# Patient Record
Sex: MALE | Race: WHITE | Employment: FULL TIME | URBAN - METROPOLITAN AREA
[De-identification: names, ages, dates, MRNs, and addresses within clinical notes are randomized per-mention and may not be internally consistent; named-entity substitution may affect disease eponyms.]

---

## 2018-07-02 ENCOUNTER — TRANSFERRED RECORDS (OUTPATIENT)
Dept: HEMATOLOGY | Facility: CLINIC | Age: 38
End: 2018-07-02

## 2019-04-26 ENCOUNTER — APPOINTMENT (OUTPATIENT)
Dept: GENERAL RADIOLOGY | Facility: CLINIC | Age: 39
End: 2019-04-26
Attending: EMERGENCY MEDICINE
Payer: COMMERCIAL

## 2019-04-26 ENCOUNTER — HOSPITAL ENCOUNTER (EMERGENCY)
Facility: CLINIC | Age: 39
Discharge: HOME | End: 2019-04-26
Attending: EMERGENCY MEDICINE | Admitting: EMERGENCY MEDICINE
Payer: COMMERCIAL

## 2019-04-26 VITALS
HEART RATE: 72 BPM | SYSTOLIC BLOOD PRESSURE: 131 MMHG | DIASTOLIC BLOOD PRESSURE: 96 MMHG | WEIGHT: 165 LBS | OXYGEN SATURATION: 99 % | TEMPERATURE: 97.6 F | RESPIRATION RATE: 16 BRPM

## 2019-04-26 DIAGNOSIS — R07.9 CHEST PAIN, UNSPECIFIED TYPE: ICD-10-CM

## 2019-04-26 LAB
ANION GAP SERPL CALCULATED.3IONS-SCNC: 7 MMOL/L (ref 3–14)
BASOPHILS # BLD AUTO: 0.1 10E9/L (ref 0–0.2)
BASOPHILS NFR BLD AUTO: 1.2 %
BUN SERPL-MCNC: 9 MG/DL (ref 7–30)
CALCIUM SERPL-MCNC: 8.4 MG/DL (ref 8.5–10.1)
CHLORIDE SERPL-SCNC: 108 MMOL/L (ref 94–109)
CO2 SERPL-SCNC: 27 MMOL/L (ref 20–32)
CREAT SERPL-MCNC: 0.87 MG/DL (ref 0.66–1.25)
DIFFERENTIAL METHOD BLD: NORMAL
EOSINOPHIL # BLD AUTO: 0.2 10E9/L (ref 0–0.7)
EOSINOPHIL NFR BLD AUTO: 1.9 %
ERYTHROCYTE [DISTWIDTH] IN BLOOD BY AUTOMATED COUNT: 12.2 % (ref 10–15)
GFR SERPL CREATININE-BSD FRML MDRD: >90 ML/MIN/{1.73_M2}
GLUCOSE SERPL-MCNC: 106 MG/DL (ref 70–99)
HCT VFR BLD AUTO: 46.8 % (ref 40–53)
HGB BLD-MCNC: 15.9 G/DL (ref 13.3–17.7)
IMM GRANULOCYTES # BLD: 0 10E9/L (ref 0–0.4)
IMM GRANULOCYTES NFR BLD: 0.4 %
INTERPRETATION ECG - MUSE: NORMAL
LYMPHOCYTES # BLD AUTO: 2.3 10E9/L (ref 0.8–5.3)
LYMPHOCYTES NFR BLD AUTO: 27.1 %
MCH RBC QN AUTO: 32.7 PG (ref 26.5–33)
MCHC RBC AUTO-ENTMCNC: 34 G/DL (ref 31.5–36.5)
MCV RBC AUTO: 96 FL (ref 78–100)
MONOCYTES # BLD AUTO: 0.7 10E9/L (ref 0–1.3)
MONOCYTES NFR BLD AUTO: 7.8 %
NEUTROPHILS # BLD AUTO: 5.3 10E9/L (ref 1.6–8.3)
NEUTROPHILS NFR BLD AUTO: 61.6 %
NRBC # BLD AUTO: 0 10*3/UL
NRBC BLD AUTO-RTO: 0 /100
PLATELET # BLD AUTO: 297 10E9/L (ref 150–450)
POTASSIUM SERPL-SCNC: 3.6 MMOL/L (ref 3.4–5.3)
RBC # BLD AUTO: 4.86 10E12/L (ref 4.4–5.9)
SODIUM SERPL-SCNC: 142 MMOL/L (ref 133–144)
TROPONIN I SERPL-MCNC: <0.015 UG/L (ref 0–0.04)
TROPONIN I SERPL-MCNC: <0.015 UG/L (ref 0–0.04)
WBC # BLD AUTO: 8.6 10E9/L (ref 4–11)

## 2019-04-26 PROCEDURE — 80048 BASIC METABOLIC PNL TOTAL CA: CPT | Performed by: EMERGENCY MEDICINE

## 2019-04-26 PROCEDURE — 99285 EMERGENCY DEPT VISIT HI MDM: CPT

## 2019-04-26 PROCEDURE — 85025 COMPLETE CBC W/AUTO DIFF WBC: CPT | Performed by: EMERGENCY MEDICINE

## 2019-04-26 PROCEDURE — 71046 X-RAY EXAM CHEST 2 VIEWS: CPT

## 2019-04-26 PROCEDURE — 93005 ELECTROCARDIOGRAM TRACING: CPT

## 2019-04-26 PROCEDURE — 84484 ASSAY OF TROPONIN QUANT: CPT | Performed by: EMERGENCY MEDICINE

## 2019-04-26 ASSESSMENT — ENCOUNTER SYMPTOMS
SHORTNESS OF BREATH: 0
NUMBNESS: 0
PALPITATIONS: 0
ABDOMINAL PAIN: 0
FEVER: 0
NAUSEA: 0
WHEEZING: 0
COUGH: 0
HEADACHES: 0
WEAKNESS: 0

## 2019-04-26 NOTE — ED PROVIDER NOTES
"  History     Chief Complaint:  Chest pain.     HPI:   The history is provided by the patient.      Adriano Nelson is a generally healthy 38 year old male, current daily smoker, who presents with chest pain. The patient reports an onset of \"stabbing\" chest pain 3 hours ago. He denies any wheezing, cough, fever, rash, shortness of breath, or leg swelling with this. He states he has not had this pain before in the past.     Allergies:  No known drug allergies      Medications:    The patient is not currently taking any prescribed medications.     Past Medical History:    The patient does not have any past pertinent medical history.     Past Surgical History:    History reviewed. No pertinent surgical history.     Family History:    Hyperlipidemia: father     Social History:  No current PCP  Marital Status:  Single  Smoking status: current daily smoker     Review of Systems   Constitutional: Negative for fever.   Respiratory: Negative for cough, shortness of breath and wheezing.    Cardiovascular: Positive for chest pain. Negative for palpitations and leg swelling.   Gastrointestinal: Negative for abdominal pain and nausea.   Neurological: Negative for weakness, numbness and headaches.   All other systems reviewed and are negative.      Physical Exam     Patient Vitals for the past 24 hrs:   BP Temp Temp src Pulse Heart Rate Resp SpO2 Weight   04/26/19 1500 (!) 131/96 -- -- -- -- -- 99 % --   04/26/19 1400 130/82 -- -- 72 75 -- 99 % --   04/26/19 1345 127/80 -- -- 64 68 -- 99 % --   04/26/19 1330 (!) 123/91 -- -- 67 68 -- 98 % --   04/26/19 1315 119/89 -- -- 68 64 -- 98 % --   04/26/19 1300 (!) 127/94 -- -- 77 73 -- 99 % --   04/26/19 1245 128/88 -- -- 74 78 -- 97 % --   04/26/19 1230 (!) 143/98 -- -- 73 76 -- 99 % --   04/26/19 1220 (!) 136/98 97.6  F (36.4  C) Oral 81 -- 16 100 % 74.8 kg (165 lb)        Physical Exam  Constitutional: Patient is well appearing. No distress.  Head: Atraumatic.  Mouth/Throat: " Oropharynx is clear and moist. No oropharyngeal exudate.  Eyes: Conjunctivae and EOM are normal. No scleral icterus.  Neck: Normal range of motion. Neck supple.   Cardiovascular: Normal rate, regular rhythm, normal heart sounds and intact distal pulses.   Pulmonary/Chest: Breath sounds normal. No respiratory distress.  Abdominal: Soft. Bowel sounds are normal. No distension. No tenderness. No rebound or guarding.   Musculoskeletal: Normal range of motion. No edema or tenderness.   Neurological: Alert and orientated to person, place, and time. No observable focal neuro deficit  Skin: Warm and dry. No rash noted. Not diaphoretic.     Emergency Department Course     Imaging:  Radiographic findings were communicated with the patient who voiced understanding of the findings.    XR Chest 2 Views  Impression: No active infiltrates.     As read by Radiology.     Laboratory:  (1239) Troponin I: <0.015  (1415) Troponin I: <0.015  CBC: WNL (WBC 8.6, HGB 15.9, )   BMP: glucose 106, calcium 8.4, o/w WNL (Creatinine 0.87)    Emergency Department Course:  Past medical records, nursing notes, and vitals reviewed.  1236: I performed an exam of the patient and obtained history, as documented above.  IV started and blood drawn for laboratory testing. Results are as above.    The patient was sent for X-ray imaging while in the emergency department, findings above.       1420: I rechecked the patient. Explained findings to the patient.    I rechecked the patient. Findings and plan explained to the Patient. Patient discharged home with instructions regarding supportive care, medications, and reasons to return. The importance of close follow-up was reviewed.       Impression & Plan      Medical Decision Making:  Adriano Nelson is a 38 year old male who presents with chest pain.  The work up in the Emergency Department is negative.  The differential diagnosis of chest pain is broad and includes life threatening etiologies such  as acute coronary syndrome, myocardial infarction, pulmonary embolism, acute aortic dissection, amongst others.  The patient's EKG was nonischemic and troponin was normal x2.  The chest pain symptom complex would be atypical for coronary ischemia.  The patient is low risk for PE and PERC neg so I feel the risks of CT imaging outweighs any benefits.  Chest xray reveals no evidence of pneumonia or pneumothorax.  No serious etiology for the chest pain were detected today during this visit.   Close follow up with primary care is indicated should the pain continue, as further work up may be performed; this was made clear to the patient, who understands.     Critical Care time:  none    Diagnosis:    ICD-10-CM    1. Chest pain, unspecified type R07.9        Disposition:  discharged to home    Discharge Medications: There are no discharge medications for this visit.    I, Megan Beh, am serving as a scribe at 12:36 PM on 4/26/2019 to document services personally performed by Jamaal Loera MD based on my observations and the provider's statements to me.      Megan Beh  4/26/2019   [facility] EMERGENCY DEPARTMENT       Jamaal Loera MD  04/26/19 9365

## 2019-04-26 NOTE — ED AVS SNAPSHOT
[facility] Emergency Department  [address]  [facility] [address]  Phone:  [phone]  Fax:  [phone]                                    Adriano Nelson   MRN: [id number]    Department:  [facility] Emergency Department   Date of Visit:  4/26/2019           After Visit Summary Signature Page    I have received my discharge instructions, and my questions have been answered. I have discussed any challenges I see with this plan with the nurse or doctor.    ..........................................................................................................................................  Patient/Patient Representative Signature      ..........................................................................................................................................  Patient Representative Print Name and Relationship to Patient    ..................................................               ................................................  Date                                   Time    ..........................................................................................................................................  Reviewed by Signature/Title    ...................................................              ..............................................  Date                                               Time          22EPIC Rev 08/18

## 2022-01-10 ENCOUNTER — TRANSCRIBE ORDERS (OUTPATIENT)
Dept: OTHER | Age: 42
End: 2022-01-10

## 2022-01-10 DIAGNOSIS — D68.01: Primary | ICD-10-CM

## 2022-08-16 ENCOUNTER — OFFICE VISIT (OUTPATIENT)
Dept: HEMATOLOGY | Facility: CLINIC | Age: 42
End: 2022-08-16
Attending: PHYSICIAN ASSISTANT
Payer: COMMERCIAL

## 2022-08-16 VITALS
WEIGHT: 162.2 LBS | DIASTOLIC BLOOD PRESSURE: 76 MMHG | SYSTOLIC BLOOD PRESSURE: 117 MMHG | OXYGEN SATURATION: 100 % | BODY MASS INDEX: 21.97 KG/M2 | HEIGHT: 72 IN | TEMPERATURE: 98.1 F | HEART RATE: 68 BPM | RESPIRATION RATE: 12 BRPM

## 2022-08-16 DIAGNOSIS — D69.9 HEMORRHAGIC DIATHESIS (H): Primary | ICD-10-CM

## 2022-08-16 DIAGNOSIS — Z86.2 HISTORY OF VON WILLEBRAND'S DISEASE: ICD-10-CM

## 2022-08-16 DIAGNOSIS — Z83.2 FAMILY HISTORY OF VON WILLEBRAND DISEASE: ICD-10-CM

## 2022-08-16 LAB
APTT PPP: 33 SECONDS (ref 22–38)
CLOSURE TME COLL+ADP BLD: 278 SECONDS
CLOSURE TME COLL+EPINEP BLD: 223 SECONDS
INR PPP: 0.93 (ref 0.85–1.15)

## 2022-08-16 PROCEDURE — 85245 CLOT FACTOR VIII VW RISTOCTN: CPT | Mod: 59 | Performed by: PHYSICIAN ASSISTANT

## 2022-08-16 PROCEDURE — 85576 BLOOD PLATELET AGGREGATION: CPT

## 2022-08-16 PROCEDURE — 99204 OFFICE O/P NEW MOD 45 MIN: CPT | Performed by: PHYSICIAN ASSISTANT

## 2022-08-16 PROCEDURE — 85730 THROMBOPLASTIN TIME PARTIAL: CPT | Performed by: PHYSICIAN ASSISTANT

## 2022-08-16 PROCEDURE — 85240 CLOT FACTOR VIII AHG 1 STAGE: CPT | Performed by: PHYSICIAN ASSISTANT

## 2022-08-16 PROCEDURE — 85390 FIBRINOLYSINS SCREEN I&R: CPT | Mod: 26 | Performed by: PATHOLOGY

## 2022-08-16 PROCEDURE — 85245 CLOT FACTOR VIII VW RISTOCTN: CPT | Performed by: PHYSICIAN ASSISTANT

## 2022-08-16 PROCEDURE — G0463 HOSPITAL OUTPT CLINIC VISIT: HCPCS

## 2022-08-16 PROCEDURE — 85246 CLOT FACTOR VIII VW ANTIGEN: CPT | Performed by: PHYSICIAN ASSISTANT

## 2022-08-16 PROCEDURE — 85610 PROTHROMBIN TIME: CPT | Performed by: PHYSICIAN ASSISTANT

## 2022-08-16 PROCEDURE — 36415 COLL VENOUS BLD VENIPUNCTURE: CPT | Performed by: PHYSICIAN ASSISTANT

## 2022-08-16 PROCEDURE — 85247 CLOT FACTOR VIII MULTIMETRIC: CPT | Performed by: PHYSICIAN ASSISTANT

## 2022-08-16 ASSESSMENT — PAIN SCALES - GENERAL: PAINLEVEL: NO PAIN (0)

## 2022-08-16 NOTE — PATIENT INSTRUCTIONS
[facility]  [facility]  [address] [place] [address], [place] [address]  Main: [phone], Fax: [phone]    It was a pleasure seeing you today.  Thank you for allowing us to be involved in your care.  Please let us know if there is anything else we can do for you, so that we can be sure you are leaving completely satisfied with your care experience.    Labs today.  Our lab is located within our clinic space.  We will communicate these to you by Phone: [phone] . With your permission we may leave a detailed voicemail, please see below for our contact information should you have any questions about your results. Also, please note that some lab results may take a week or so to get back. Feel free to call or message if you have not heard back from us within 7-10 days.       For dental procedures, colonoscopy or endoscopy biopsies, heavy periods or ongoing nosebleeds, please call the center.  We may consider using  Amicar (aminocaproic acid) or Lysteda (transexamic acid).  Our pharmacy has special discounted pricing for Amicar ($5/bottle of syrup or $1/bottle of pills).  Our pharmacy is located within our clinic space.    If using DDAVP IV, please remember to limit fluid intake for 24 hours following puff(s) or infusion to approximately 1L for most adults.  These medications can be used for procedures/minor surgeries as well as heavy periods and nosebleeds.    If you would like further information about your bleeding disorder, the following websites may be of help:  The National Hemophilia Foundation (includes HANDI link for educational resources)   www.hemophilia.org  The World Federation of Hemophilia (includes [employer] [facility] [place] Directory)   www.wfh.org  The Foundation for Women and Girls with Bleeding Disorders        www.fwgbd.org     If you have emergency needs in the evening or weekend, our emergency phone number on [place] ([facility]  [place]) is [phone].  If  you are with an outside provider, have them call the doctor consult line [phone] and ask for the hematologist on call or Dr. Chinmay Puckett.    We would like a provider on our team to see you at least annually for optimal care and to allow us to continue to prescribe for you.  Joni Ewing PA-C and Jada Tenorio PA-C are our physician assistants that are specialized in bleeding and clotting disorders that you may be able to see more readily.    Return to clinic in  D.  Please schedule return appointment with Joni Ewing PA-C .    Your nurse clinician is Madiha FATIMA [phone] If she is unavailable and you have immediate concerns, please call [phone] and ask for a nurse.

## 2022-08-16 NOTE — PROGRESS NOTES
"    [facility]  [address] [place] [address] [address], [place] [address]  Main: [phone], Fax: [phone]    Patient seen at: [facility] at [address] [place] [address]    Outpatient Visit Note:    Patient: Adriano Nelson  MRN: [id number]  : 1980  JUDIT: 2022    Reason for visit:  Reported history of Von Willebrand Disease.     HPI:  Adriano is a 41 year old male with a reported history of Von Willebrand Disease, self referred to this clinic for consultation. Adriano is a poor historian but fortunately his wife is able to provide some of the history via telephone today.     They indicate that Adriano was first diagnosed with Von Willebrand Disease after their daughter was found to have Von Willebrand Disease about 7 years ago when she was about 6 months old. At the time, their daughter apparently had a non-traumatic intra-cranial hemorrhage and required surgery. According to their report, she underwent well child exam and her head was measured larger than usual and thus a subsequent MRI/CT head was done showing \"Old and new blood\". Further workup showed that Sary (their daughter) has Von Willebrand Disease and is managed by [facility] [place] in [facility]. They do not know which type of Von Willebrand Disease Sary has. According to Adriano's wife, Sary most recent testing was done back in 2022 and her levels were around \"50%\". Sary most recently underwent upper GI endoscopy about one week ago and they took the opportunity to check her labs once again while she was under anesthesia. These labs are pending as of today. Sary does reportedly have prolong bleeding after traumatic injuries but no frequent epistaxis. Adriano also has a daughter from his previous marriage, Elizabeth, and she has history of easy bruising and epistaxis.     It was after their daughter's diagnosis of Von Willebrand Disease 7 years ago that Adriano " "was subsequently get tested. He recalls that he was tested at [facility] in [place] but he is not able to recall his levels. He recalls that he was told that he did have low Von Willebrand's numbers.     He reports that his father and his only sibling (2 years younger sister) also was found to have Von Willebrand Disease (VWD) after he was found to have VWD 7 years ago. His father lives here in [place] and apparently has had frequent epistaxis in the past. His father underwent shoulder surgery about 2 years ago at [facility] in [facility] and apparently had bleeding complications the surgery that required him to return to the hospital for \"infusion\". Adriano's sister lives in [place] and Adriano does not know much about his sister's bleeding history. Adriano's sister does not have any children.     Adriano's bleeding history:    He recalls frequent epistaxis as a child and recalls that he has epistaxis almost every other day. He did not, however, required any intervention such as cauterization and never saw an ENT specialist.     He reports easy bruising with and without recallable traumatic events.     He had 4 wisdom teeth extracted in his teenage years and did not have any significant bleeding.     However, he underwent tonsillectomy back when he was in  for which he recalls that he did have significant post procedural bleeding that required his parents to call 911 and have him brought back to the hospital. He did require surgery to control his bleeding after tonsillectomy.     He fell off a roof back when he was 17 years of age and hit some of his teeth off and suffered right wrist fracture. He recalls some bleeding on his lips.     About 3 years ago, he had a large abrasion of his toe and reportedly bled for 45 minutes.     2003, he had a right flores surface laceration / abrasion from a metal stud accident where he almost require to get skin grafting. Some bleeding " with this but not severe.     He does get routine dental cleaning every 6 months and reportedly was told in the past that he does bleed more than others by the dental hygienist.     ROS:  Denies any bleeding complications. Specifically, no frequent epistaxis. No issues with oral mucosal bleeding. Denies any hematuria or blood in stools. Denies any shortness of breath. No chest pain. No cough. No fever.    Medications:  No current outpatient medications on file.     No current facility-administered medications for this visit.     Allergies:   No Known Allergies    PmHx:  As above.     Social History:   Deferred.    Family History:  As above.     Objective:  Vitals: /76 (BP Location: Right arm, Patient Position: Sitting, Cuff Size: Adult Regular)   Pulse 68   Temp 98.1  F (36.7  C) (Oral)   Resp 12   Ht 1.829 m (6')   Wt 73.6 kg (162 lb 3.2 oz)   SpO2 100%   BMI 22.00 kg/m    Exam:   Complete exam was not performed today.     Labs:  4/18/2022 labs done at outside facility:             Assessment:  In summary, Adriano is a 41 year old male with a reported history of Von Willebrand Disease, self referred to this clinic for consultation. Adriano has a family history of bleeding diathesis and Von Willebrand Disease and was told to have low Von Willebrand levels in the past as well as some bleeding diathesis. Thus he likely also has Von Willebrand Disease. Unfortunately, we do not have any records of his previous Von Willebrand profile levels. At this time, we do not know how severe his VWD is and if he does have VWD, which subtype.     Given his history, he likely has mild VWD.     Diagnosis:    Bleeding diathesis.    Family history of Von Willebrand Disease.    Family history of bleeding diathesis.     Plan:  First and foremost, we need to establish the diagnosis. Will repeat lab testing including VWF panel, INR, aPTT and PFA-100 (platelet function screening).     I took some time today to educate him on Von  Willebrand Disease and answer all his questions to his satisfaction.     I explain to him that if he should found to have VWD, he likely does not need routine hematological treatment with his daily activities. I discuss general management in regard to Von Willebrand Disease including the use of IV DDAVP and/or antifibrinolytics (aminocaproic acid or tranexamic acid) and/or the use of Von Willebrand factor concentrate replacement therapy if he should experience any unusual bleeding issues or if he should need any invasive or surgical procedures in the future.     I will provide our final assessment, diagnosis and plan recommendation once his lab tests are back.    He has not seen a primary care provider for many years. I encourage him to establish care with a primary care provider for routine health maintenance.     In the mean time, he is provided with our contact information and is instructed to call if he should have any further questions or concerns.     I will also have our genetic counselor reach out to him to obtain a pedigree.       KATHLEEN Lopes PA-C  Physician Assistant  [facility].     55 minutes spent on the date of the encounter doing chart review, history and exam, documentation and further activities per the note.    Time IN: 09:09  Time OUT: 09:48    Addendum 8/19/2022 at 15:20:    Received records from [facility].     5/6/2016 VWF panel:  VWF:RCo 25%; VWF:Antigen 23%; RCo/Ag ratio 1.1; Factor VIII assay 61%  No testing or information on VWF multimer.       KATHLEEN Lopes PA-C  Physician Assistant  [facility].

## 2022-08-18 LAB
FACT VIII ACT/NOR PPP: 51 % (ref 55–200)
VWF AG ACT/NOR PPP IA: 29 % (ref 50–200)
VWF:AC ACT/NOR PPP IA: 30 % (ref 50–180)

## 2022-08-19 LAB — VWF MULTIMERS PPP IB: NORMAL

## 2022-08-21 LAB — VWF:RCO ACT/NOR PPP PL AGG: 24 %

## 2022-08-29 LAB — VWF MULTIMERS PPP QL: NORMAL

## 2022-08-30 LAB — VON WILLEBRAND EVAL PPP-IMP: NORMAL

## 2022-09-14 ENCOUNTER — TELEPHONE (OUTPATIENT)
Dept: HEMATOLOGY | Facility: CLINIC | Age: 42
End: 2022-09-14

## 2022-09-14 DIAGNOSIS — D68.00 VON WILLEBRAND'S DISEASE (H): Primary | ICD-10-CM

## 2022-09-14 RX ORDER — ALBUTEROL SULFATE 90 UG/1
1-2 AEROSOL, METERED RESPIRATORY (INHALATION)
Status: CANCELLED
Start: 2022-09-14

## 2022-09-14 RX ORDER — HEPARIN SODIUM (PORCINE) LOCK FLUSH IV SOLN 100 UNIT/ML 100 UNIT/ML
5 SOLUTION INTRAVENOUS
Status: CANCELLED | OUTPATIENT
Start: 2022-09-14

## 2022-09-14 RX ORDER — DIPHENHYDRAMINE HYDROCHLORIDE 50 MG/ML
50 INJECTION INTRAMUSCULAR; INTRAVENOUS
Status: CANCELLED
Start: 2022-09-14

## 2022-09-14 RX ORDER — EPINEPHRINE 1 MG/ML
0.3 INJECTION, SOLUTION, CONCENTRATE INTRAVENOUS EVERY 5 MIN PRN
Status: CANCELLED | OUTPATIENT
Start: 2022-09-14

## 2022-09-14 RX ORDER — MEPERIDINE HYDROCHLORIDE 25 MG/ML
25 INJECTION INTRAMUSCULAR; INTRAVENOUS; SUBCUTANEOUS EVERY 30 MIN PRN
Status: CANCELLED | OUTPATIENT
Start: 2022-09-14

## 2022-09-14 RX ORDER — ALBUTEROL SULFATE 0.83 MG/ML
2.5 SOLUTION RESPIRATORY (INHALATION)
Status: CANCELLED | OUTPATIENT
Start: 2022-09-14

## 2022-09-14 RX ORDER — HEPARIN SODIUM,PORCINE 10 UNIT/ML
5 VIAL (ML) INTRAVENOUS
Status: CANCELLED | OUTPATIENT
Start: 2022-09-14

## 2022-09-14 NOTE — TELEPHONE ENCOUNTER
[facility]  [facility]  [facility] [address], [address], [place] [address]  Main: [phone], Fax: [phone]    Telephone Note:    Patient: Adriano Nelson  MRN: [id number]  : 1980  Date of this note written: 2022    This writer called the patient and spoke with Adriano and his wife on the phone about his Von Willebrand Testing results. I explain to them that Adriano likely has Type I Von Willebrand disease. I further discuss with him about management of Type I Von Willebrand Disease. I explain that in general from a day to day activity living situation, he does not require specific treatment for his Von Willebrand Disease. However, if he should experience any traumatic injuries and has signs of bleeding, he might require hematological treatments. Also if he should need any invasive or surgical procedures in the future, he likely will need some hematological treatments. I am recommending that Adriano is to proceed with IV DDAVP trial to determine if he is responsive to DDAVP and also determine if he has Type 1C Von Willebrand Disease. Adriano's wife is rather familiar with this testing protocol as their daughter, who also was diagnosed with Von Willebrand Disease at a young age had to go through the same DDAVP trial at [facility] bleeding disorder clinic.    Final Assessment and Diagnosis:    Type I Von Willebrand Disease.     Plan:    Proceed with IV DDAVP trial. Order placed and I will have one of our nursing staffs contact the patient to arrange to have this done in the next 1-2 weeks.    Genetic counselor consult for Pedigree and possibly discussion on formal genetic testing for mutation on Von Willebrand Disease in this family. I will contact our genetic counselor to reach out to patient.     He is instructed to call our clinic if he should experience any unusual bleeding issues or if he should suffered a traumatic  injuries and has bleeding complications or if he should need any invasive or surgical procedures in the future.     Otherwise, I will plan to see him back every 1-2 years for follow up. Sooner if he should experience any bleeding issues.     Component      Latest Ref Rng & Units 8/16/2022          10:02 AM   Factor 8 Assay      55 - 200 % 51 (L)   von Willebrand Factor Antigen      50 - 200 % 29 (L)   von Willebrand Factor Activity      50 - 180 % 30 (L)   von Willebrand Factor Multimers       Normal Multimers distribution.    INR      0.85 - 1.15 0.93   PTT      22 - 38 Seconds 33   PLT Funct COL/EPI      <170 Seconds 223 (H)   Platelet Function Closure Time Col/ADP      <120 Seconds 278 (H)   Ristocetin Cofactor Activity      51 - 215 % 24 (L)       Joni Ewing PA-C, MPAS  Physician Assistant  [facility].

## 2022-09-15 ENCOUNTER — TELEPHONE (OUTPATIENT)
Dept: HEMATOLOGY | Facility: CLINIC | Age: 42
End: 2022-09-15

## 2022-09-26 ENCOUNTER — TELEPHONE (OUTPATIENT)
Dept: HEMATOLOGY | Facility: CLINIC | Age: 42
End: 2022-09-26

## 2025-04-08 ENCOUNTER — TELEPHONE (OUTPATIENT)
Dept: HEMATOLOGY | Facility: CLINIC | Age: 45
End: 2025-04-08
Payer: COMMERCIAL

## 2025-04-08 NOTE — TELEPHONE ENCOUNTER
"[id number]  Adriano Nelson  44 year old male  [facility] Diagnosis: VWD type 1  CB[facility] Provider: JT Ewing    Incoming call from Adriano who has a history of Type 1 vWD. He just got back from the \"NOW\" (National Outreach von Willebrand) conference with his family. He has daughters that are effected by vWD as well.    There was a speaker at the conference who suggested that patients with vWD have their iron levels monitored so Adriano is hoping to get some iron levels checked. Adriano denies any signs/sx of bleeding or anemia. Per JT Ewing's most recent note, he is supposed to follow up every 1-2 years so it is appropriate to get the patient scheduled for follow up. Patient can discuss getting labs checked with provider at that time.    Jean Claude THOMAS RN  [facility]  Office: [phone]  Clinic: [phone]  Fax: [phone]    "